# Patient Record
Sex: MALE | Race: OTHER | URBAN - METROPOLITAN AREA
[De-identification: names, ages, dates, MRNs, and addresses within clinical notes are randomized per-mention and may not be internally consistent; named-entity substitution may affect disease eponyms.]

---

## 2019-03-14 ENCOUNTER — OFFICE VISIT (OUTPATIENT)
Dept: ORTHOPEDIC SURGERY | Age: 49
End: 2019-03-14

## 2019-03-14 VITALS
RESPIRATION RATE: 14 BRPM | BODY MASS INDEX: 25.65 KG/M2 | DIASTOLIC BLOOD PRESSURE: 79 MMHG | SYSTOLIC BLOOD PRESSURE: 127 MMHG | WEIGHT: 159.6 LBS | HEIGHT: 66 IN | HEART RATE: 78 BPM | TEMPERATURE: 98.3 F | OXYGEN SATURATION: 100 %

## 2019-03-14 DIAGNOSIS — M54.9 UPPER BACK PAIN: ICD-10-CM

## 2019-03-14 DIAGNOSIS — M51.36 DDD (DEGENERATIVE DISC DISEASE), LUMBAR: Primary | ICD-10-CM

## 2019-03-14 DIAGNOSIS — M54.5 LOW BACK PAIN, UNSPECIFIED BACK PAIN LATERALITY, UNSPECIFIED CHRONICITY, WITH SCIATICA PRESENCE UNSPECIFIED: ICD-10-CM

## 2019-03-14 RX ORDER — CELECOXIB 100 MG/1
CAPSULE ORAL
Qty: 60 CAP | Refills: 1 | Status: SHIPPED | OUTPATIENT
Start: 2019-03-14 | End: 2019-04-18 | Stop reason: SDUPTHER

## 2019-03-14 RX ORDER — LORATADINE 10 MG/1
10 TABLET ORAL
COMMUNITY

## 2019-03-14 RX ORDER — GABAPENTIN 300 MG/1
300 CAPSULE ORAL
Qty: 30 CAP | Refills: 1 | Status: SHIPPED | OUTPATIENT
Start: 2019-03-14 | End: 2019-04-18 | Stop reason: SDUPTHER

## 2019-03-14 NOTE — PATIENT INSTRUCTIONS
Aprenda sobre la enfermedad degenerativa del disco - [ Learning About Degenerative Disc Disease ]  ¿Qué es la enfermedad degenerativa del disco?    La enfermedad degenerativa del disco o discopatía degenerativa no es realmente alonso enfermedad. Es un término usado para describir los Monique Efrem discos de la [place]umna vertebral a medida que se envejece. Los discos intervertebrales son discos pequeños y esponjosos que separan los huesos (vértebras) que conforman la [place] vertebral. Los discos cumplen la función de amortiguar el impacto en la [place], para que esta pueda flexionarse, inclinarse y girar. La enfermedad degenerativa del disco puede ocurrir en ton o más lugares a lo sandy de la columna. Ocurre la mayoría de las [employer] discos en la parte baja de la espalda y el reynold. ¿Qué la causa? A medida que envejecemos, nuestros discos intervertebrales se deterioran o degeneran. Celeste deterioro causa los síntomas de la enfermedad degenerativa del disco en algunas personas. Cuando los discos se deterioran, pueden perder líquido y secarse, y las capas exteriores pueden tener grietas o desgarros diminutos. [place] lleva a megan acolchado y [employer] de la columna. El cuerpo reacciona a esto creando protuberancias óseas en la [place]um[place] llamadas espolones óseos. Estos espolones pueden presionar en las raíces nerviosas raquídeas o la médula loco. [place] puede causar dolor y puede afectar el buen funcionamiento de los nervios. ¿Cuáles son los síntomas? Muchas personas con enfermedad degenerativa del disco no tienen dolor. Morgan otras tienen dolor intenso u otros síntomas que limitan lauren O'shu. Algunos de los síntomas más comunes son:  · Dolor en la espalda o el reynold. El lugar del dolor depende de los discos que se vean afectados. · Dolor que empeora cuando usted se [place], soledad al inclinarse hacia adelante, estirarse o girar.   · Dolor que puede ocurrir en los glúteos (nalgas), el brazo o la pierna si está pinzado un nervio. · Entumecimiento u hormigueo en el brazo o la pierna. ¿Cómo se diagnostica? Un médico a menudo puede diagnosticar la enfermedad degenerativa del disco romel un examen físico. Si koo examen no muestra señales de alonso afección grave, es poco probable que las pruebas de diagnóstico por imágenes (soledad alonso radiografía) ayuden a koo médico a encontrar la causa de lauren síntomas. A veces, la enfermedad degenerativa del disco se descubre cuando se cornelius alonso radiografía por otra razón, soledad alonso lesión u otro problema de [place]. Morgan aun si el médico descubre que usted tiene enfermedad degenerativa del disco, eso no siempre significa que usted vaya a tener síntomas. ¿Cómo se trata? Hay varias cosas que usted puede hacer en el hogar para manejar el dolor causado por mirian problema. · Para [employer], use hielo o calor (el que le dé mayor alivio) en la jerman afectada. ? Aplique hielo o alonso compresa fría en la jerman por entre 10 y 21 minutos cada vez. Póngase un paño sam entre el hielo y la piel. ? Póngase alonso bolsa de [place], alonso almohadilla eléctrica ajustada a nivel bajo o un paño tibio en la espalda. Póngase un paño sam entre la almohadilla térmica y la piel. No se vaya a dormir con alonso almohadilla térmica sobre la piel. · Pregúntele a koo médico si puede jenifer acetaminofén (soledad Tylenol) o medicamentos antiinflamatorios no esteroideos, soledad ibuprofeno o naproxeno. Koo médico puede recetarle medicamentos más potentes si es necesario. Sea jaguar con los medicamentos. Tatyana y siga todas las instrucciones de la [place]. · Lizzy algo de ejercicio todos los días. El ejercicio es alonso de las mejores maneras para ayudar a tener menos dolor en la espalda y a que se mantenga en mejores condiciones. Es mejor comenzar cada ejercicio lentamente. Es posible que note un poco de dolor, y eso está sujit. Morgan si un ejercicio le empeora el dolor, destephanie de Corina.  Aquí hay cosas que puede probar:  ? Ivy Bloch. Es la [place] simple y jose francisco vez la mejor para la espalda. Promueve la circulación de la steven y [place] a los músculos a mantenerse stefan. ? Ejercicios que le estiren suavemente y [employer] músculos del abdomen, la espalda y las piernas. Mientras más stefan estén esos músculos, mejor podrán protegerle la espalda. Si tiene dolor denita o intenso en la espalda o la columna, jose francisco vez necesite otros tratamientos, soledad fisioterapia. En algunos casos, es posible que koo médico le sugiera operarse. La atención de seguimiento es alonso parte clave de koo tratamiento y seguridad. Asegúrese de hacer y acudir a todas las citas, y llame a koo médico si está teniendo problemas. También es alonso buena idea saber los resultados de lauren exámenes y mantener alonso lista de los medicamentos que cornelius. ¿Dónde puede encontrar más información en inglés? Minor [place]ia a http://lonny-leonor.info/. Mika DUMONT en la búsqueda para aprender más acerca de \"Aprenda sobre la enfermedad degenerativa del disco - [ Learning About Degenerative Disc Disease ]. \"  Revisado: 20 septiembre, 2018  Versión del contenido: 11.9  © 7611-0657 Healthwise, Incorporated. Las instrucciones de cuidado fueron adaptadas bajo licencia por [employer] (which disclaims liability or warranty for this information). Si usted tiene [place] afección médica o sobre estas instrucciones, siempre pregunte a koo profesional de mehran. Healthwise, Incorporated niega toda garantía o responsabilidad por koo uso de esta información. Dolor en la parte baja de la espalda (lumbalgia): Ejercicios - [ Low Back Pain: Exercises ]  Instrucciones de cuidado  Aquí se presentan algunos ejemplos de ejercicios típicos de rehabilitación para tratar koo afección. Empiece cada ejercicio lentamente. Reduzca la intensidad del ejercicio si Lashae Retort a tener dolor.   Koo médico o fisioterapeuta le dirán cuándo puede comenzar con estos ejercicios y cuáles funcionarán mejor para usted. Cómo hacer los ejercicios  [place]    1. Acuéstese boca abajo, apoyando koo cuerpo con los antebrazos. 2. Lizzy presión con los codos en el piso para elevar la espalda. Al hacer esto, relaje los músculos del estómago y permita que koo espalda se arquee sin usar los músculos de la espalda. Al hacer presión, evite que las caderas o la pelvis se separen del piso. 3. Mantenga la posición de 15 a 30 segundos y luego relájese. 4. Repita de 2 a 4 veces. Ejercicio de alternar brazo y pierna (gladys de caza)    1. Comience con las anali y las rodillas en el piso. 2. Contraiga los músculos del abdomen. 3. Eleve alonso pierna del suelo y manténgala recta detrás de usted. Tenga cuidado de no dejar caer la cadera hacia abajo, porque eso hará que se le tuerza el tronco.  4. Mantenga la posición romel unos 6 segundos y luego baje la pierna y Pablo a la otra pierna. 5. Repita de 8 a 12 veces con cada pierna. 6. Con el tiempo, vaya aumentando [employer] de 10 a 30 segundos cada vez.  7. Si se siente estable y seguro con la pierna elevada, intente levantar el brazo opuesto directamente enfrente de usted al [facility]. Ejercicio de rodillas al pecho    1. Acuéstese boca arriba con las rodillas flexionadas y los pies apoyados sobre el piso. 2. Lleve alonso rodilla hacia el pecho, manteniendo el otro pie apoyado en el suelo (o dejando la otra pierna recta, soledad lo sienta mejor en la parte baja de la espalda). 3. Mantenga la parte baja de la espalda presionada contra el suelo. Sosténgalo por lo menos de 15 a 30 segundos. 4. Relájese y regrese la rodilla a la posición inicial.  5. Repita el ejercicio con la otra pierna. Repita de 2 a 4 veces con cada pierna. 6. Para lograr mayor estiramiento, deje la otra pierna apoyada en el suelo mientras se meghan la rodilla [place]. Abdominales    1.  Acuéstese en el suelo boca arriba, con las rodillas dobladas en un ángulo de 90 grados. Los pies deben estar apoyados en el suelo, a unas 12 pulgadas (30 cm) de las nalgas (glúteos). 2. Cruce los [place]. Si esto le causa molestias en el reynold, pruebe a poner las anali detrás del reynold (no de la maureen), con los codos abiertos. 3. Contraiga lentamente los músculos del abdomen y eleve los omóplatos del suelo. 4. Mantenga la maureen alineada con el cuerpo y no presione la barbilla hacia el pecho. 5. Sostenga esta posición por 1 o 2 segundos y después baje lentamente de nuevo hacia el suelo. 6. Repita de 8 a 12 veces. Ejercicio de inclinación de pelvis    1. Acuéstese de espalda con las rodillas flexionadas. 2. \"Tense\" koo estómago. [place] significa apretar los músculos contrayendo el ombligo e imaginando que se mueve hacia la columna vertebral. Deberá sentir soledad si la espalda estuviera ejerciendo presión sobre el suelo y que las caderas y la pelvis se balancean hacia atrás. 3. Mantenga la posición romel aproximadamente 6 segundos mientras respira suavemente. 4. Repita de 8 a 12 veces. [place] con el talón    1. Acuéstese boca arriba con ambas rodillas flexionadas y los tobillos doblados de manera que solo los talones estén sobre el piso. Las rodillas deben estar dobladas más o menos a 90 grados. 2. A continuación criselda presión con los talones en el suelo, apriete las nalgas (glúteos) y levante las caderas del suelo hasta que los hombros, las caderas y las rodillas estén en línea recta. 3. Mantenga la posición romel unos 6 segundos mientras sigue respirando normalmente, y luego baje lentamente las caderas hacia el piso y descanse por hasta 10 segundos. [address] a 12 repeticiones. Estiramiento de isquiotibiales en el ck de alonso jackie    1. Acuéstese boca arriba en el ck de [place], con alonso pierna a través de la [place]. 2. Deslice la pierna hacia arriba por la pared, para enderezar la rodilla.  Debe sentir un leve estiramiento en la parte posterior de la pierna. 3. Sostenga el estiramiento por lo menos de 15 a 30 segundos. No arquee la espalda, estire los dedos de los pies ni flexione las rodillas. Mantenga un talón tocando el suelo y el otro tocando la pared. 4. Repita con la otra pierna. 5. Repita de 2 a 4 veces con cada pierna. Estiramiento de los músculos flexores de la cadera    1. Póngase de rodillas en el suelo con alonso Lennis Brine y Diandra Apple atrás. Coloque la rodilla de adelante encima del pie. Mantenga la otra rodilla en contacto con el suelo. 2. Empuje lentamente la cadera hacia adelante hasta que sienta un estiramiento en la parte superior del muslo de la pierna que está atrás. 3. Sostenga el estiramiento por lo menos de 15 a 30 segundos. Repita con la otra pierna. 4. Repita de 2 a 4 veces a cada lado. Sentadillas de pared    1. Párese con la espalda a entre 10 y 12 pulgadas (25 a 30 cm) de distancia de la pared. 2. Inclínese hacia la pared [employer] la espalda quede plana sobre kenny. 3. Deslícese lentamente hacia abajo hasta que las rodillas estén ligeramente flexionadas, presionando la parte baja de la espalda contra la pared. 4. Mantenga la posición romel unos 6 segundos y después deslícese hacia arriba por la pared. 5. Repita de 8 a 12 veces. La atención de seguimiento es alonso parte clave de koo tratamiento y seguridad. Asegúrese de hacer y acudir a todas las citas, y llame a koo médico si está teniendo problemas. También es alonso buena idea saber los resultados de lauren exámenes y mantener alonso lista de los medicamentos que cornelius. ¿Dónde puede encontrar más información en inglés? Anusha Cedillo a http://lonny-leonor.info/. Rosa Pink [id number] en la búsqueda para aprender más acerca de \"Dolor en la parte baja de la espalda (lumbalgia): Ejercicios - [ Low Back Pain: Exercises ]. \"  Revisado: 20 septiembre, 2018  Versión del contenido: 11.9  © 5826-0940 [employer].  Las instrucciones de cuidado fueron adaptadas bajo licencia por [employer] (which disclaims liability or warranty for this information). Si usted tiene [place] afección médica o sobre estas instrucciones, siempre pregunte a koo profesional de mehran. [facility] niega toda garantía o responsabilidad por koo uso de esta información.

## 2019-03-14 NOTE — PROGRESS NOTES
[address], [address]  [place] [address]  Phone: [phone]  Fax: [phone]        Steffany Pérez  : 1970  PCP: No primary care provider on file. NEW PATIENT      ASSESSMENT AND PLAN     Diagnoses and all orders for this visit:    1. DDD (degenerative disc disease), lumbar    2. Low back pain, unspecified back pain laterality, unspecified chronicity, with sciatica presence unspecified  -     AMB POC XRAY, SPINE, LUMBOSACRAL; 4+    3. Upper back pain  -     AMB POC XRAY, SPINE; THORACIC, 2 VIEW       1. Advised to stay active as tolerated. 2. Discussed life style modification, PT, medication as treatment options  3. Trial of Celebrex  4. Given information on low back exercises and DDD In Taiwanese    Follow-up Disposition:  Return if symptoms worsen or fail to improve. [address] [facility] is seen today as self referral for complaints of upper and low back pain. [address] is a 54 y.o. male. Today pt c/o upper and low back pain of 8 years duration. Pt has had trauma that caused pain. He reports an injury 8 years ago lifting a heavy rock. . Pt speaks Taiwanese, very little [place]. Visit conducted using [facility] [place]  phone. Pt states his pain is worse with prolonged sitting. Pt notes that standing and walking does not aggravate nor relieve his pain. He states he does not feel much better with laying. Pt denies weight loss, night sweats. He notes his legs do burn and tingle at night. Pt denies hx of DM. No red flags. Location of pain: mid back  Does pain radiate into extremities: BLE buttock and thigh pain and numbness R>L, worse at night  Does patient have weakness: no   Pt denies saddle paresthesias. Medications pt is on: Anti-inflammatory from [facility] and yoga with previous benefit. He notes it is decreasing in effectiveness. Pt denies recent ED visits or hospitalizations. Denies persistent fevers, chills, weight changes, neurogenic bowel or bladder symptoms. Pt denies any recent GI ulcers, bleeds or renal dysfucntion. Treatments patient has tried:  Physical therapy:No  Doing HEP: Yoga with decreasing benefit  Non-opioid medications: Yes  Spinal injections: No  Spinal surgery- No.      reviewed. PMHx of HTN. Pt works as a cook. Pain Assessment  3/14/2019   Location of Pain Back   Severity of Pain 8   Quality of Pain Aching; Sharp;Burning   Duration of Pain Persistent   Frequency of Pain Constant   Limiting Behavior Yes   Result of Injury No         PAST MEDICAL HISTORY   Past Medical History:   Diagnosis Date    Hypertension        History reviewed. No pertinent surgical history. MEDICATIONS      Current Outpatient Medications   Medication Sig Dispense Refill    loratadine (CLARITIN) 10 mg tablet Take 10 mg by mouth.          ALLERGIES  No Known Allergies       SOCIAL HISTORY    Social History     Socioeconomic History    Marital status: UNKNOWN     Spouse name: Not on file    Number of children: Not on file    Years of education: Not on file    Highest education level: Not on file   Social Needs    Financial resource strain: Not on file    Food insecurity - worry: Not on file    Food insecurity - inability: Not on file   Catalyze needs - medical: Not on file   Catalyze needs - non-medical: Not on file   Occupational History    Occupation: [employer]     Employer: OTHER     Comment: [address]    Smoking status: Never Smoker    Smokeless tobacco: Never Used   Substance and Sexual Activity    Alcohol use: No     Frequency: Never    Drug use: Not on file    Sexual activity: Not on file   Other Topics Concern   [address] Service Not Asked    Blood Transfusions Not Asked    Caffeine Concern Not Asked    Occupational Exposure Not Asked    Hobby Hazards Not Asked    Sleep Concern Not Asked    Stress Concern Not Asked    Weight Concern Not Asked    Special Diet Not Asked    Back Care Not Asked    Exercise Not Asked    Bike Helmet Not Asked   [address] Not Asked    Self-Exams Not Asked   Social History Narrative    Not on file       FAMILY HISTORY  Family History   Problem Relation Age of Onset    No Known Problems Mother     No Known Problems Father          REVIEW OF SYSTEMS  Review of Systems   Constitutional: Negative for chills, fever and weight loss. Respiratory: Negative for shortness of breath. Cardiovascular: Negative for chest pain. Gastrointestinal: Negative for constipation. Negative for fecal incontinence   Genitourinary: Negative for dysuria. Negative for urinary incontinence   Musculoskeletal: Positive for back pain and myalgias. Per HPI   Skin: Negative for rash. Neurological: Positive for tingling. Negative for dizziness, tremors, focal weakness and headaches. Endo/Heme/Allergies: Does not bruise/bleed easily. Psychiatric/Behavioral: The patient does not have insomnia. PHYSICAL EXAMINATION  Visit Vitals  /79   Pulse 78   Resp 14   Wt 159 lb 9.6 oz (72.4 kg)   SpO2 100%          Accompanied by self. Constitutional:  Well developed, well nourished, in no acute distress. Psychiatric: Affect and mood are appropriate. Integumentary: No rashes or abrasions noted on exposed areas. Cardiovascular/Peripheral Vascular: Intact l pulses. No peripheral edema is noted BLE. Lymphatic:  No evidence of lymphedema. No cervical lymphadenopathy. SPINE/MUSCULOSKELETAL EXAM    Cervical spine:  Neck is midline. Normal muscle tone. No focal atrophy is noted. Tenderness to palpation C7-T1. Negative Spurling's sign. Negative Tinel's sign. Negative Sandoval's sign. Sensation grossly intact to light touch. Thoracic spine:  Tenderness to palpation R TL junction. Lumbar spine:  No rash, ecchymosis, or gross obliquity. No fasciculations. No focal atrophy is noted. No increased pain with flexion or extension. Tenderness to palpation B/L L4-5. No tenderness to palpation at the sciatic notch. SI joints non-tender. Trochanters non tender. Sensation grossly intact to light touch. MOTOR:       Hip Flex  Quads Hamstrings Ankle DF EHL Ankle PF   Right +4/5 +4/5 +4/5 +4/5 +4/5 +4/5   Left +4/5 +4/5 +4/5 +4/5 +4/5 +4/5     DTRs are 2+ patella, and 1+ Achilles. Straight Leg raise negative. Ambulation without assistive device. FWB. RADIOGRAPHS  Thoracic spine xray films reviewed:  1) no compression    Lumbar spine xray films reviewed:  1) disc space narrowing L5-S1   2) facet changes L4 to sacrum  3) no instability    Written by Kaykay Bright, as dictated by Kareem Hayden MD.    I, Dr. Kareem Hayden MD, confirm that all documentation is accurate. Mr. Nora Koch may have a reminder for a \"due or due soon\" health maintenance. I have asked that he contact his primary care provider for follow-up on this health maintenance.

## 2019-04-18 ENCOUNTER — OFFICE VISIT (OUTPATIENT)
Dept: ORTHOPEDIC SURGERY | Age: 49
End: 2019-04-18

## 2019-04-18 VITALS
RESPIRATION RATE: 16 BRPM | TEMPERATURE: 97.8 F | BODY MASS INDEX: 26 KG/M2 | HEART RATE: 70 BPM | DIASTOLIC BLOOD PRESSURE: 84 MMHG | SYSTOLIC BLOOD PRESSURE: 124 MMHG | OXYGEN SATURATION: 98 % | WEIGHT: 161.8 LBS | HEIGHT: 66 IN

## 2019-04-18 DIAGNOSIS — M51.36 DDD (DEGENERATIVE DISC DISEASE), LUMBAR: Primary | ICD-10-CM

## 2019-04-18 RX ORDER — CELECOXIB 100 MG/1
CAPSULE ORAL
Qty: 60 CAP | Refills: 5 | Status: SHIPPED | OUTPATIENT
Start: 2019-04-18 | End: 2019-10-17 | Stop reason: SDUPTHER

## 2019-04-18 RX ORDER — GABAPENTIN 300 MG/1
300 CAPSULE ORAL
Qty: 30 CAP | Refills: 5 | Status: SHIPPED | OUTPATIENT
Start: 2019-04-18

## 2019-04-18 NOTE — PATIENT INSTRUCTIONS
Cuidado de la espalda y prevención de lesiones: Instrucciones de cuidado - [ Back Care and Preventing Injuries: Care Instructions ]  Instrucciones de cuidado    Usted puede lesionarse la espalda con muchas de las actividades de la mina diaria, soledad levantar alonso caja pesada, inclinarse en el jardín, hacer ejercicio en el gimnasio e incluso al salir de la cama. Morgan puede mantener koo espalda monique y baron haciendo algunos ejercicios. También puede seguir algunos consejos para sentarse, dormir y levantar cosas y evitar volver a lesionarse. Hable con koo médico antes de empezar un programa de ejercicios. Pida ayuda si necesita más información sobre cómo mantener koo espalda saludable. La atención de seguimiento es alonso parte clave de koo tratamiento y seguridad. Asegúrese de hacer y acudir a todas las citas, y llame a koo médico si está teniendo problemas. También es alonso buena idea saber los resultados de lauren exámenes y mantener alonso lista de los medicamentos que cornelius. ¿Cómo puede cuidarse en el hogar? · Mantenga un peso saludable para evitar sobrecargar la parte baja de la espalda. · No fume. Fumar aumenta el riesgo de osteoporosis, que debilita la columna vertebral. Si necesita ayuda para dejar de fumar, hable con koo médico sobre programas y medicamentos para dejar de fumar. Podrían aumentar lauren probabilidades de dejar el hábito para siempre. · Asegúrese de dormir en alonso posición que mantenga las curvas naturales de la espalda y en un colchón cómodo. Duerma de lado con alonso almohada entre las rodillas o boca arriba con alonso almohada debajo de las rodillas. Estas posiciones pueden reducir la tensión en la espalda. · Cuando salga de la cama, acuéstese de lado y doble ambas rodillas. Deje que los pies cuelguen por el borde de la cama mientras empuja el cuerpo hacia arriba con ambos brazos. Deslícese hacia el borde de la cama.  Asegúrese de [employer] estén alineados con las nalgas (los glúteos) y Delonte chatterjee de pie.  · Si debe permanecer de pie mucho tiempo, ponga un pie sobre un taburete, un bordillo o alonso caja. Lizzy ejercicio para fortalecer la espalda y otros músculos  · [employer] al menos 30 minutos de ejercicio la mayoría de los días de la [place]. Caminar es alonso buena opción. Marguerita American desee hacer otras actividades, soledad correr, nadar, [employer], o jugar al tenis o deportes de equipo. · Estire los músculos de la espalda. Aquí hay algunos ejercicios para intentar:  ? Acuéstese de espalda con las rodillas flexionadas y los pies apoyados en el suelo. Lleve suavemente alonso de las rodillas flexionadas hacia el pecho. Vuelva a poner teto pie en el suelo y luego lizzy lo mismo con la otra rodilla. Mantenga entre 15 y 27 segundos. Repita de 2 a 4 veces. ? Lizzy inclinaciones pélvicas. Acuéstese de espalda con las rodillas flexionadas. Contraiga los músculos abdominales. Hunda koo ombligo hacia adentro y [place], en dirección a las costillas. Deberá sentir soledad si la espalda estuviera ejerciendo presión sobre el suelo y que las caderas y la pelvis se despegan levemente del suelo. Mantenga la posición romel 6 segundos mientras respira de Liseth pausada. · Mantenga stefan los músculos del tronco. Los músculos de la espalda, el abdomen y los glúteos sostienen la columna vertebral.  ? Contraiga el abdomen e imagine que julito del ombligo hacia la columna. Mantenga la posición romel 6 segundos y luego relájese. Recuerde seguir respirando de manera normal [employer]. ? Morales Sol. Hágalos siempre con las rodillas dobladas. Mantenga la parte baja de la espalda sobre el suelo y [employer] hombros hacia las rodillas con un movimiento lento y uniforme. Mantenga los brazos cruzados sobre el pecho. Si esto le causa molestias en el reynold, pruebe a poner las anali detrás del reynold (no de la maureen), con los codos abiertos. ?  Acuéstese boca arriba con las rodillas flexionadas y los pies apoyados sobre el suelo. Contraiga los músculos abdominales y luego empuje con los pies y eleve las nalgas algunas pulgadas. Mantenga la posición romel 6 segundos mientras continúa respirando de Liseth normal y luego vuelva a bajar lentamente hacia el suelo. Repita de 8 a 12 veces. ? Si le gusta el ejercicio en nevaeh, pruebe con Pilates o yoga. Estas clases tienen posiciones que fortalecen los músculos del tronco.  Protéjase la espalda mientras está sentado  · Colóquese alonso almohada pequeña, alonso toalla enrollada o un rollo lumbar en la curva de la espalda si necesita más apoyo. · Siéntese en alonso silla que sea lo suficientemente baja soledad para poner ambos pies en el suelo con las rodillas renee al nivel de las caderas. Si koo silla o escritorio es [employer], use un reposapiés para elevar las rodillas. · Al conducir, Omar's las rodillas renee al nivel de las caderas. Siéntese derecho y conduzca con ambas anali sobre el volante. Los brazos deben estar levemente flexionados. · Pruebe usar alonso silla ergonómica con apoyo para las rodillas (\"kneeling chair\"), la cual ayuda a inclinar las caderas hacia taet. Sedillo le reduce la presión en la parte baja de la espalda. · Intente sentarse sobre alonso pelota de ejercicio. Puede balancearse de lado a lado, lo que ayuda a mantener la espalda relajada. Levante objetos de manera correcta  · Acuclíllese, doblando solo las caderas y las rodillas. Si es necesario, ponga alonso rodilla en el suelo y extienda la otra rodilla frente a usted en ángulo recto (genuflexión). · Empuje el pecho hacia tate. Sedillo le ayuda a mantener la parte superior de la espalda derecha mientras mantiene un arco ligero en la parte baja. · Sostenga la carga tan cerca de koo cuerpo soledad sea posible, al nivel del ombligo. · Utilice los pies para cambiar de dirección con pasos cortos. · Dirija con las caderas al cambiar de dirección. Mantenga los hombros alineados con las caderas mientras se desplaza.  No tuerza el cuerpo. · Asiente la carga con cuidado, acuclillándose únicamente con las rodillas y las caderas. ¿Cuándo debe pedir ayuda? Preste especial atención a los cambios en castillo mehran y asegúrese de comunicarse con castillo médico si:    · Castillo dolor de espalda empeora. ¿Dónde puede encontrar más información en inglés? Suraj Quiros a http://lonny-leonor.info/. Oliviariba S810 en la búsqueda para aprender más acerca de \"Cuidado de la espalda y prevención de lesiones: Instrucciones de cuidado - [ Back Care and Preventing Injuries: Care Instructions ]. \"  Revisado: 20 septiembre, 2018  Versión del contenido: 11.9  © 5027-3302 Healthwise, Incorporated. Las instrucciones de cuidado fueron adaptadas bajo licencia por [employer] Connections (which disclaims liability or warranty for this information). Si usted tiene [place] afección médica o sobre estas instrucciones, siempre pregunte a castillo profesional de mehran. Healthwise, Incorporated niega toda garantía o responsabilidad por castillo uso de esta información. Antiinflamatorios no esteroideos Trude Carbajal): Instrucciones de cuidado - [ Nonsteroidal Anti-Inflammatory Drugs (NSAIDs): Care Instructions ]  Instrucciones de cuidado    Los antiinflamatorios no esteroideos (CHRISS) alivian el dolor y la [place]. También puede usarlos para reducir la hinchazón y la inflamación. Los CHRISS de venta suzi incluyen:  · Ibuprofeno (Advil, Motrin). · Naproxeno (Aleve). La aspirina ([address]) también es un [place]. Morgan no funciona del mismo modo que estos otros [place]. Los CHRISS recetados incluyen:  · Diclofenaco (Voltaren). · Indometacina (Indocin). · Piroxicam (Feldene). [place] los [place] exactamente soledad le fueron recetados. Llame a castillo médico si denise estar teniendo problemas con castillo medicamento. Si usted está tomando medicamentos de [employer], dacia y siga todas las instrucciones de la etiqueta.   [place] de seguimiento es alonso parte clave de castillo tratamiento y seguridad. Asegúrese de hacer y acudir a todas las citas, y llame a koo médico si está teniendo problemas. También es alonos buena idea saber los resultados de va exámenes y mantener alonso lista de los medicamentos que cornelius. ¿Qué debe saber acerca de los Jose M Jacky? · No use un CHRISS sin receta por más de 10 días. Hable elisha con koo médico.  · Los efectos secundarios más comunes de los Jose M Jacky son dolor estomacal, acidez estomacal y [place]. Los Jose M Jacky pueden irritar el revestimiento estomacal. Si el medicamento le causa malestar [place], puede probar a tomarlo con las comidas. Morgan si eso no ayuda, hable con koo médico para asegurarse de que no sea un problema más maren, soledad alonso úlcera estomacal o sangrado en el estómago o los intestinos. · El uso de Jose M Jacky puede:  ? Causar presión arterial jerry. ? Empeorar los síntomas de insuficiencia cardíaca. ? Aumentar el riesgo de ataque cardíaco, ataque cerebral, daño renal y reacciones cutáneas. · Va riesgos son mayores si cornelius CHRISS en dosis más altas o romel más tiempo de lo que dice la [place]. Las [employer] son mayores de 72 años o que tienen enfermedad cardíaca, estomacal o intestinal tienen mayor riesgo de problemas. Aspirina  La aspirina no es [employer] otros Jose M Jacky. Puede ayudar a determinadas personas a reducir koo riesgo de un ataque cardíaco o un ataque cerebral. Morgan jenifer aspirina no es adecuado para todas las personas, debido a que puede producir sangrado grave. Hable con koo médico antes de empezar a jenifer [facility]. Usted y koo médico pueden decidir si la aspirina es alonso buena opción para usted según koo riesgo de un ataque cardíaco o un ataque cerebral y koo riesgo de sangrado grave. Si tiene un riesgo bajo de un ataque al corazón o un ataque cerebral, los beneficios de la aspirina probablemente no superen el riesgo de sangrado.   · Si [place] otros CHRISS en mucha cantidad, la aspirina puede no funcionar tan sujit para prevenir el ataque cardíaco y [facility] ataque cerebral.  · Si cornelius aspirina todos los días para koo corazón, hable con koo médico antes de jenifer otros CHRISS. · No le dé aspirina a nadie megan de [address] Se ha relacionado con el síndrome de [place], alonso enfermedad grave. ¿Cuándo debe pedir ayuda? Llame al 911 en cualquier momento que considere que necesita atención de [place]. Por ejemplo, llame si:    · Se desmayó (perdió el conocimiento).   · Vomita steven o algo parecido a granos de café molido.     · Las heces son de color rojizo o muy sanguinolentas (con steven).    Llame a koo médico ahora mismo o busque atención médica inmediata si:    · Va heces son negruzcas y parecidas al alquitrán o tienen rastros de steven.    Preste especial atención a los cambios en koo mehran y asegúrese de comunicarse con koo médico si tiene algún problema. ¿Dónde puede encontrar más información en inglés? [place] a http://lonny-leonor.info/. Escriba A328 en la búsqueda para aprender más acerca de \"Antiinflamatorios no esteroideos (Justine Micah): Instrucciones de cuidado - [ Nonsteroidal Anti-Inflammatory Drugs (NSAIDs): Care Instructions ]. \"  Revisado: 3 david, 2018  Versión del contenido: 11.9  © 6770-8839 Healthwise, Incorporated. Las instrucciones de cuidado fueron adaptadas bajo licencia por [employer] Connections (which disclaims liability or warranty for this information). Si usted tiene [place] afección médica o sobre estas instrucciones, siempre pregunte a koo profesional de mehran. Healthwise, Incorporated niega toda garantía o responsabilidad por koo uso de esta información.

## 2019-04-18 NOTE — PROGRESS NOTES
[facility] [address], [address]  [facility], [address]  Phone: [phone]  Fax: [phone]        Elsa Vincent  : 1970  PCP: No primary care provider on file. PROGRESS NOTE      ASSESSMENT AND PLAN    Diagnoses and all orders for this visit:    1. DDD (degenerative disc disease), lumbar  -     celecoxib (CELEBREX) 100 mg capsule; Take 1 capsule PO Daily-BID PRN Pain, Take with Food. Indications: Joint Damage causing Pain and Loss of Function  -     gabapentin (NEURONTIN) 300 mg capsule; Take 1 Cap by mouth nightly as needed for Pain. 1. Advised to continue HEP. 2. Continue Celebrex and Gabapentin PRN  3. Given information on preventing injury and NSAIDs in [place]    F/U PRN or 6 months      [address] is a 54 y.o. male. Pt presents to the office for a f/u visit for low back pain. Last OV given trial of Celebrex and Gabapentin. Pt speaks [place], very little [place]. Visit conducted using [facility]  phone. Pt reports relief with Celebrex, takes prn. He denies stomach upset. Notes constipation and diarrhea that came and went, inquires if connected to his back. Pt reports benefit with taking Gabapentin, now takes PRN. When he has pain , ususally on the left side of his back, denies sciatica. Location of pain: mid and low back L>R  Does pain radiate into extremities: BLE pain and numbness controlled with Gabapentin  Does patient have weakness: no   Pt denies saddle paresthesias. Medications pt is on: Celebrex 100mg PRN. Gabapentin 300mg QHS, now PRN. Pt denies recent ED visits or hospitalizations. Denies persistent fevers, chills, weight changes, neurogenic bowel or bladder symptoms. Treatments patient has tried:  Physical therapy:No  Doing HEP: Yoga with decreasing benefit  Non-opioid medications: Yes  Spinal injections: No  Spinal surgery- No.      reviewed. PMHx of HTN.  Pt works as a erwin. Initial injury lifting heavy rock 8 years ago. Pain Assessment  3/14/2019   Location of Pain Back   Severity of Pain 8   Quality of Pain Aching; Sharp;Burning   Duration of Pain Persistent   Frequency of Pain Constant   Limiting Behavior Yes   Result of Injury No       PAST MEDICAL HISTORY   Past Medical History:   Diagnosis Date    Hypertension        No past surgical history on file. [facility] MEDICATIONS      Current Outpatient Medications   Medication Sig Dispense Refill    celecoxib (CELEBREX) 100 mg capsule Take 1 capsule PO Daily-BID PRN Pain, Take with Food. Indications: Joint Damage causing Pain and Loss of Function 60 Cap 5    gabapentin (NEURONTIN) 300 mg capsule Take 1 Cap by mouth nightly as needed for Pain. 30 Cap 5    loratadine (CLARITIN) 10 mg tablet Take 10 mg by mouth.           ALLERGIES  No Known Allergies       SOCIAL HISTORY    Social History     Socioeconomic History    Marital status: UNKNOWN     Spouse name: Not on file    Number of children: Not on file    Years of education: Not on file    Highest education level: Not on file   Occupational History    Occupation: Erwin     Employer: OTHER     Comment: [address] [facility] Financial resource strain: Not on file    Food insecurity:     Worry: Not on file     Inability: Not on file   [employer] needs:     Medical: Not on file     Non-medical: Not on file   Tobacco Use    Smoking status: Never Smoker    Smokeless tobacco: Never Used   Substance and Sexual Activity    Alcohol use: No     Frequency: Never    Drug use: Not on file    Sexual activity: Not on file   Lifestyle    Physical activity:     Days per week: Not on file     Minutes per session: Not on file    Stress: Not on file   Relationships    Social connections:     Talks on phone: Not on file     Gets together: Not on file     Attends Mosque service: Not on file     Active member of club or organization: Not on file     Attends meetings of clubs or organizations: Not on file     Relationship status: Not on file    Intimate partner violence:     Fear of current or ex partner: Not on file     Emotionally abused: Not on file     Physically abused: Not on file     Forced sexual activity: Not on file   Other Topics Concern     Service Not Asked    Blood Transfusions Not Asked    Caffeine Concern Not Asked    Occupational Exposure Not Asked    Hobby Hazards Not Asked    Sleep Concern Not Asked    Stress Concern Not Asked    Weight Concern Not Asked    Special Diet Not Asked    Back Care Not Asked    Exercise Not Asked    Bike Helmet Not Asked   [address] Not Asked    Self-Exams Not Asked   Social History Narrative    Not on file       FAMILY HISTORY    Family History   Problem Relation Age of Onset    No Known Problems Mother     No Known Problems Father        REVIEW OF SYSTEMS  Review of Systems   Constitutional: Negative for chills, fever and weight loss. Respiratory: Negative for shortness of breath. Cardiovascular: Negative for chest pain. Gastrointestinal: Negative for constipation. Negative for fecal incontinence   Genitourinary: Negative for dysuria. Negative for urinary incontinence   Musculoskeletal:        Per HPI   Skin: Negative for rash. Neurological: Negative for dizziness, tingling, tremors, focal weakness and headaches. Endo/Heme/Allergies: Does not bruise/bleed easily. Psychiatric/Behavioral: The patient does not have insomnia. PHYSICAL EXAMINATION  Visit Vitals  /84   Pulse 70   Temp 97.8 °F (36.6 °C)   Resp 16   Ht 5' 6\" (1.676 m)   Wt 161 lb 12.8 oz (73.4 kg)   SpO2 98%   BMI 26.12 kg/m²         Accompanied by self. Constitutional:  Well developed, well nourished, in no acute distress. Psychiatric: Affect and mood are appropriate. Integumentary: No rashes or abrasions noted on exposed areas. Cardiovascular/Peripheral Vascular: Intact l pulses.  No peripheral edema is noted BLE. Lymphatic:  No evidence of lymphedema. No cervical lymphadenopathy. SPINE/MUSCULOSKELETAL EXAM       Lumbar spine:  No rash, ecchymosis, or gross obliquity. No fasciculations. No focal atrophy is noted. Slight pain with lumbar extension. Tenderness to palpation none lumbar spine. No tenderness to palpation at the sciatic notch. SI joints non-tender. Trochanters non tender. Straight Leg raise negative. Ambulation without assistive device. FWB. Tandem gait, heel walk intact. DF/PF strength intact. Hip ROM pain free. Written by Aria Haddad, as dictated by Karen Lara MD.    I, Dr. Karen Lara MD, confirm that all documentation is accurate. Mr. Josh Hou may have a reminder for a \"due or due soon\" health maintenance. I have asked that he contact his primary care provider for follow-up on this health maintenance.

## 2019-10-17 ENCOUNTER — OFFICE VISIT (OUTPATIENT)
Dept: ORTHOPEDIC SURGERY | Age: 49
End: 2019-10-17

## 2019-10-17 VITALS
WEIGHT: 165.4 LBS | SYSTOLIC BLOOD PRESSURE: 130 MMHG | OXYGEN SATURATION: 99 % | TEMPERATURE: 98.4 F | BODY MASS INDEX: 26.58 KG/M2 | RESPIRATION RATE: 19 BRPM | DIASTOLIC BLOOD PRESSURE: 82 MMHG | HEIGHT: 66 IN | HEART RATE: 71 BPM

## 2019-10-17 DIAGNOSIS — M51.36 DDD (DEGENERATIVE DISC DISEASE), LUMBAR: Primary | ICD-10-CM

## 2019-10-17 RX ORDER — CELECOXIB 100 MG/1
CAPSULE ORAL
Qty: 60 CAP | Refills: 2 | Status: SHIPPED | OUTPATIENT
Start: 2019-10-17

## 2019-10-17 NOTE — PROGRESS NOTES
[address], [address]  [place] [address]  Phone: [phone]  Fax: [phone]        Shital Conley  : 1970  PCP: No primary care provider on file. PROGRESS NOTE      ASSESSMENT AND PLAN    Diagnoses and all orders for this visit:    1. DDD (degenerative disc disease), lumbar  -     celecoxib (CELEBREX) 100 mg capsule; Take 1 capsule PO Daily-BID PRN Pain, Take with Food. Indications: joint damage causing pain and loss of function       1. Advised to continue HEP. 2. Continue Celebrex and Gabapentin PRN  3. Given information on back care, urgent symptoms in Australian    F/U PRN    [address] is a 54 y.o. male. Pt presents to the office for a 6 month f/u visit for DDD. Pt speaks Australian, very little [place]. Visit conducted using [employer]  phone . Pt affirms he has been doing HEP. He indicates his back is improving, he does need the medications as often. Pt denies any BLE pain or paresthesias in a while. He notes some pain in both shoulders and elbows due to kitchen work. Pt went to hospital/PCP 1 month ago for stomach problem, maybe gastritis, which resolved. Pt notes the doctor did not find a problem. Did not tell him to stop Celebrex. Denies GI upset, black tarry stools w/Celebrex. No other health changes/issues. Still has RFs. Rare sciatica. Location of pain: mid and low back L>R occasionally  Does pain radiate into extremities: BLE rarely  Does patient have weakness: no   Pt denies saddle paresthesias. Medications pt is on: Celebrex 100mg PRN. Gabapentin 300mg PRN. Pt denies recent ED visits or hospitalizations. Denies persistent fevers, chills, weight changes, neurogenic bowel or bladder symptoms. Treatments patient has tried:  Physical therapy:No  Doing HEP: Yoga with decreasing benefit  Non-opioid medications: Yes  Spinal injections: No  Spinal surgery- No.      reviewed. PMHx of HTN.  Pt works as a cook. Initial injury lifting heavy rock 8 years ago. Pain Assessment  10/17/2019   Location of Pain Back   Severity of Pain 0   Quality of Pain -   Duration of Pain -   Frequency of Pain Intermittent   Limiting Behavior -   Result of Injury -       PAST MEDICAL HISTORY   Past Medical History:   Diagnosis Date    Hypertension        History reviewed. No pertinent surgical history. Elilara Escobar MEDICATIONS      Current Outpatient Medications   Medication Sig Dispense Refill    celecoxib (CELEBREX) 100 mg capsule Take 1 capsule PO Daily-BID PRN Pain, Take with Food. Indications: joint damage causing pain and loss of function 60 Cap 2    gabapentin (NEURONTIN) 300 mg capsule Take 1 Cap by mouth nightly as needed for Pain. 30 Cap 5    loratadine (CLARITIN) 10 mg tablet Take 10 mg by mouth.           ALLERGIES  No Known Allergies       SOCIAL HISTORY    Social History     Socioeconomic History    Marital status: UNKNOWN     Spouse name: Not on file    Number of children: Not on file    Years of education: Not on file    Highest education level: Not on file   Occupational History    Occupation: [employer]     Employer: OTHER     Comment: [facility] Financial resource strain: Not on file    Food insecurity:     Worry: Not on file     Inability: Not on file   [employer] needs:     Medical: Not on file     Non-medical: Not on file   Tobacco Use    Smoking status: Never Smoker    Smokeless tobacco: Never Used   Substance and Sexual Activity    Alcohol use: No     Frequency: Never    Drug use: Not on file    Sexual activity: Not on file   Lifestyle    Physical activity:     Days per week: Not on file     Minutes per session: Not on file    Stress: Not on file   Relationships    Social connections:     Talks on phone: Not on file     Gets together: Not on file     Attends Taoist service: Not on file     Active member of club or organization: Not on file     Attends meetings of clubs or organizations: Not on file     Relationship status: Not on file    Intimate partner violence:     Fear of current or ex partner: Not on file     Emotionally abused: Not on file     Physically abused: Not on file     Forced sexual activity: Not on file   Other Topics Concern     Service Not Asked    Blood Transfusions Not Asked    Caffeine Concern Not Asked    Occupational Exposure Not Asked    Hobby Hazards Not Asked    Sleep Concern Not Asked    Stress Concern Not Asked    Weight Concern Not Asked    Special Diet Not Asked    Back Care Not Asked    Exercise Not Asked    Bike Helmet Not Asked   [address] Not Asked    Self-Exams Not Asked   Social History Narrative    Not on file       FAMILY HISTORY    Family History   Problem Relation Age of Onset    No Known Problems Mother     No Known Problems Father        REVIEW OF SYSTEMS  Review of Systems   Constitutional: Negative for chills, fever and weight loss. Respiratory: Negative for shortness of breath. Cardiovascular: Negative for chest pain. Gastrointestinal: Negative for constipation. Negative for fecal incontinence   Genitourinary: Negative for dysuria. Negative for urinary incontinence   Musculoskeletal:        Per HPI   Skin: Negative for rash. Neurological: Negative for dizziness, tingling, tremors, focal weakness and headaches. Endo/Heme/Allergies: Does not bruise/bleed easily. Psychiatric/Behavioral: The patient does not have insomnia. PHYSICAL EXAMINATION  Visit Vitals  /82 (BP 1 Location: Left arm, BP Patient Position: Sitting)   Pulse 71   Temp 98.4 °F (36.9 °C) (Temporal)   Resp 19   Ht 5' 6\" (1.676 m)   Wt 165 lb 6.4 oz (75 kg)   SpO2 99%   BMI 26.70 kg/m²         Accompanied by self. Constitutional:  Well developed, well nourished, in no acute distress. Psychiatric: Affect and mood are appropriate.    Integumentary: No rashes or abrasions noted on exposed areas. Cardiovascular/Peripheral Vascular: No peripheral edema is noted BLE. SPINE/MUSCULOSKELETAL EXAM      Lumbar spine:  No rash, ecchymosis, or gross obliquity. No fasciculations. No focal atrophy is noted. Tenderness to palpation none lumbar spine. No tenderness to palpation at the sciatic notch. SI joints non-tender. Trochanters non tender. MOTOR:       Hip Flex  Quads Hamstrings Ankle DF EHL Ankle PF   Right +4/5 +4/5 +4/5 +4/5 +4/5 +4/5   Left +4/5 +4/5 +4/5 +4/5 +4/5 +4/5     Straight Leg raise negative. Ambulation without assistive device. FWB. Written by Thien Mireles, as dictated by Shital Lindsay MD.    I, Dr. Shital Lindsay MD, confirm that all documentation is accurate. Mr. Debi Muhammad may have a reminder for a \"due or due soon\" health maintenance. I have asked that he contact his primary care provider for follow-up on this health maintenance.

## 2019-10-17 NOTE — PROGRESS NOTES
[address]  presents today for   Chief Complaint   Patient presents with    Back Pain     fu       Is someone accompanying this pt? NO    Is the patient using any DME equipment during OV? NO    Depression Screening:  3 most recent PHQ Screens 10/17/2019   Little interest or pleasure in doing things Not at all   Feeling down, depressed, irritable, or hopeless Not at all   Total Score PHQ 2 0         Coordination of Care:  1. Have you been to the ER, urgent care clinic since your last visit? NO  Hospitalized since your last visit? NO    2. Have you seen or consulted any other health care providers outside of the [address] [place] [address] since your last visit? NO Include any pap smears or colon screening.  NO    Last  Checked 10/17/19

## 2019-10-17 NOTE — PATIENT INSTRUCTIONS
Aprenda sobre cómo tener alonso espalda saludable - [ Learning About How to Have a Healthy Back ]  ¿Qué causa el dolor de espalda? El dolor de espalda a menudo es causado por uso excesivo, distensión o lesión. Por ejemplo, las personas frecuentemente se lastiman la espalda al practicar deportes o trabajar en el mehreen, al ser sacudidas en un accidente automovilístico o al levantar algo demasiado pesado. El envejecimiento también desempeña un papel. Lei Mikey y los músculos tienden a perder fuerza a medida que envejece, lo cual aumenta las probabilidades de [place] Loralie Haitian. Los discos [employer] huesos de la columna vertebral (vértebras) podrían tener desgaste y ya no proporcionar suficiente amortiguamiento entre los [place]. Un disco que sobresale o que se rompe (hernia de disco) puede presionar sobre los nervios, causando dolor de [place]. En [employer], el dolor de espalda es el resultado de la artritis, de vértebras rotas debido a la pérdida de [employer] (osteoporosis), de alonso enfermedad o de un problema de la columna vertebral.  Aunque la mayoría de las personas tienen dolor de espalda en un momento u otro, hay medidas que se pueden jenifer para reducir la probabilidad de sufrirlo. ¿Cómo puede tener alonso espalda saludable? Reduzca la tensión en la espalda mediante alonso buena postura  El desplomarse o encorvarse por sí solo jose francisco vez no cause dolor en la parte baja de la espalda. Morgan alonso vez que la espalda se ha distendido o lesionado, la malorie postura puede empeorar el dolor. · Duerma en alonso posición que mantenga las curvas naturales de la espalda y en un colchón cómodo. Duerma de lado con alonso almohada entre las rodillas o boca arriba con alonso almohada debajo de las rodillas. Estas posiciones pueden reducir la tensión en la espalda. · Manténgase erguido cuando esté de pie o sentado.  Tener alonso \"buena postura\" por lo general significa que las Michael, los hombros y las caderas están en Rosie Ingrid recta.  · Si debe permanecer de pie mucho tiempo, ponga un pie sobre un taburete, un bordillo o Fredo Malik. Cambie de pie cada cierto tiempo. · Siéntese en alonso silla que sea lo suficientemente baja soledad para poner ambos pies en el suelo con las rodillas más o menos al nivel de la cadera. Si koo silla o koo escritorio son Lea Dryer, utilice un reposapiés con el fin de elevar las rodillas. Colóquese alonso almohada pequeña, alonso toalla enrollada o un rollo lumbar en la curva de la espalda si necesita más apoyo. · Pruebe usar alonso silla ergonómica con apoyo para las rodillas (\"kneeling chair\"), pues ayuda a inclinar las caderas hacia tate. [place] le reduce la presión en la parte baja de la espalda. · Intente sentarse sobre alonso pelota de ejercicio. Puede balancearse de lado a lado, lo que ayuda a mantener la espalda relajada. · Al conducir, [place] las rodillas renee al nivel de las caderas. Siéntese derecho y conduzca con ambas anali sobre el volante. Los brazos deben estar levemente flexionados. Reduzca la tensión en la espalda levantando objetos con cuidado  · Agáchese doblando solo la cadera y las rodillas. Si es necesario, ponga alonso rodilla en el suelo y extienda la otra rodilla frente a usted en ángulo recto (genuflexión). · Empuje el pecho hacia adelante. [place] le ayuda a mantener la parte superior de la espalda derecha mientras mantiene un arco ligero en la parte baja. · Sostenga la carga tan cerca del cuerpo soledad sea posible, a la altura del ombligo. · Utilice los pies para cambiar de dirección con pasos cortos. · Dirija con las caderas al cambiar de dirección. Mantenga los hombros en línea con las caderas [place] se desplaza. · Asiente la carga con cuidado, acuclillándose únicamente con las rodillas y las caderas. Ejercite y estire la espalda  · [place] algo de ejercicio la mayoría de los días de la [place], si koo médico lo aprueba. Puede caminar, correr, nadar o montar en bicicleta.   · [employer] músculos de la espalda. Los siguientes son algunos ejercicios que puede probar:  ? Acuéstese de espalda y lleve suavemente alonso rodilla flexionada hacia Anthony Hassan. Vuelva a poner teto pie en el suelo y luego lizzy lo mismo con la otra rodilla. ? Lizzy inclinaciones de pelvis. Acuéstese de espalda con las rodillas flexionadas. Contraiga los músculos abdominales. Hunda el ombligo hacia adentro y [place], en dirección a las costillas. Deberá sentir soledad si la espalda estuviera ejerciendo presión sobre el suelo y que las caderas y la pelvis se despegan levemente del suelo. Mantenga la posición romel 6 segundos mientras respira de Liseth pausada. ? Siéntese con la espalda contra la pared. · Mantenga stefan los músculos del tronco. Los músculos de la espalda, el abdomen y los glúteos sostienen la columna vertebral.  ? Hunda el abdomen e imagine que está llevando el ombligo hacia la columna. Mantenga la posición romel 6 segundos y luego relájese. Recuerde seguir respirando de manera normal [employer]. ? Latosha Rattan. Hágalos siempre con las rodillas dobladas. Mantenga la parte baja de la espalda sobre el suelo y [employer] hombros hacia las rodillas con un movimiento lento y uniforme. Mantenga los brazos cruzados sobre el pecho. Si esto le causa molestias en el reynold, pruebe a poner las anali detrás del reynold (no de la maureen), con los codos abiertos. ? Acuéstese boca arriba con las rodillas flexionadas y los pies apoyados sobre el suelo. Contraiga los músculos abdominales y luego empuje con los pies y eleve las nalgas algunas pulgadas. Mantenga la posición romel 6 segundos mientras continúa respirando de Liseth normal y luego vuelva a bajar lentamente hacia el suelo. Repita de 8 a 12 veces. ? Si le gusta el ejercicio en nevaeh, pruebe con Pilates o yoga.  Estas clases tienen posiciones que fortalecen los músculos del tronco.  Lleve un estilo de mina saludable  · Mantenga un peso saludable para evitar sobrecargar la espalda. · No fume. Fumar aumenta el riesgo de osteoporosis, que debilita la columna vertebral. Si necesita ayuda para dejar de fumar, hable con koo médico sobre programas y medicamentos para dejar de fumar. Estos pueden aumentar lauren probabilidades de dejar el hábito para siempre. ¿Dónde puede encontrar más información en inglés? Valentino Fraise a http://lonny-leonor.info/. Escriba L315 en la búsqueda para aprender más acerca de \"Aprenda sobre cómo tener alonso espalda saludable - [ Learning About How to Have a Healthy Back ]. \"  Revisado: 26 junio, 2019  Versión del contenido: 12.2  © 1871-3146 Healthwise, Incorporated. Las instrucciones de cuidado fueron adaptadas bajo licencia por [employer] (which disclaims liability or warranty for this information). Si usted tiene [place] afección médica o sobre estas instrucciones, siempre pregunte a koo profesional de mehran. Healthwise, Incorporated niega toda garantía o responsabilidad por koo uso de esta información. Dolor de espalda, síntomas urgentes o de emergencia: Instrucciones de cuidado - [ Back Pain, Emergency or Urgent Symptoms: Care Instructions ]  Instrucciones de cuidado    Muchas personas tienen dolor de espalda en algún momento. En la [employer], el dolor mejora con los cuidados personales, lo que incluye analgésicos (medicamentos para el dolor) de [place], hielo, calor y ejercicios. A menos que tenga síntomas de alonso lesión grave o de ataque al corazón, es posible que pueda dejar pasar algunos días antes de llamar al médico. Morgan algunos problemas de espalda son muy graves. No ignore los síntomas que deberían ser revisados de inmediato. La atención de seguimiento es alonso parte clave de koo tratamiento y seguridad. Asegúrese de hacer y acudir a todas las citas, y llame a koo médico si está teniendo problemas.  También es alonso buena idea saber los [place] de lauren exámenes y [employer] lista de los [place] cornelius. ¿Cómo puede cuidarse en el hogar? · Siéntese o acuéstese en las posiciones más cómodas y que reduzcan el dolor. Pruebe alonso de estas posiciones cuando se acueste:  ? Acuéstese boca arriba con las rodillas dobladas y apoyadas sobre [address]. ? Acuéstese en el piso con las piernas sobre el asiento de un sofá o alonso silla. ? Acuéstese de lado con las rodillas [address] las caderas y [place] las piernas. ? Acuéstese boca abajo siempre que esta posición no empeore el dolor. · No se siente sobre la cama y evite los sillones blandos, así soledad las posiciones torcidas. El reposo en cama puede aliviar el dolor al principio, richard retrasa la sanación. Evite reposar en la cama después del primer día. · Cambie de posición cada 30 minutos. Si debe sentarse por [employer], párese y descanse de estar sentado. Levántese y camine, o acuéstese en posición horizontal.  · Pruebe a usar alonso almohadilla térmica a temperatura baja o mediana de 15 a 20 minutos cada 2 o 3 horas. Pruebe con [place] marieha tibia en vez de alonso sesión con la almohadilla térmica. También puede comprar envolturas calientes (\"heat wraps\") desechables que carranza hasta 8 horas. También puede tratar de colocarse hielo o compresas frías en la espalda de 10 a 20 minutos cada vez, varias veces al día. (Póngase un paño sam entre el hielo y la piel). [place] reduce el dolor y le será más fácil mantenerse activo y hacer ejercicio. · [employer] analgésicos exactamente según las indicaciones. ? Si el médico le recetó analgésicos, tómelos según las indicaciones. ? Si no está tomando un analgésico recetado, pregúntele a koo médico si puede jenifer ton de [employer]. ¿Cuándo debe pedir ayuda? Llame al 911 en cualquier momento que considere que necesita atención de [place].  Por ejemplo, llame si:    · Es absolutamente incapaz de  alonso pierna.     · Tiene dolor de espalda junto con dolor abdominal intenso.     · Tiene síntomas de un ataque al corazón. Estos pueden incluir:  ? [place] en el pecho, o alonso sensación extraña en el pecho.  ? Sudoración. ? Falta de aire. ? Náuseas o vómito. ? Dolor, presión o alonso sensación extraña en la espalda, el reynold, la mandíbula o la parte superior del abdomen, o en ton o ambos hombros o brazos. ? Aturdimiento o debilidad repentina. ? Latidos cardíacos rápidos o irregulares. Después de llamar al  911, el operador puede decirle que [place] 1 aspirina para adultos o de 2 a 4 aspirinas de dosis baja. Espere alonso ambulancia. No trate de conducir usted mismo.    Llame a koo médico ahora mismo o busque atención médica inmediata si:    · Tiene síntomas nuevos o peores en los brazos, las piernas, el pecho, el abdomen o las nalgas. Los síntomas pueden incluir:  ? Entumecimiento u hormigueo. ? Debilidad. ? Dolor.     · Pierde el control de la vejiga o los intestinos.     · Tiene dolor de espalda y:  ? Se ha lesionado la espalda al levantar o al hacer alguna [place]. Llame si el dolor es intenso, no ha desaparecido después de 1 o 2 días y no puede hacer lauren actividades normales diarias. ? Anteriormente ha tenido alonso lesión en la espalda que requirió [place]. ? El dolor ha durado más de 4 [address] ? [place] perdido peso de Liseth inexplicable. ? Tiene fiebre. ? Tiene [address] ? Tiene cáncer o lo whitley tenido con anterioridad.    Preste especial atención a los cambios en koo mehran y asegúrese de comunicarse con koo médico si no mejora soledad se esperaba. ¿Dónde puede encontrar más información en inglés? Estelita Spurling a http://lonny-leonor.info/. Eder [place] D435 en la búsqueda para aprender más acerca de \"Dolor de espalda, síntomas urgentes o de emergencia: Instrucciones de cuidado - [ Back Pain, Emergency or Urgent Symptoms: Care Instructions ]. \"  Revisado: 26 junio, 2019  Versión del contenido: 12.2  © 9135-3049 [employer]., Incorporated.  Las instrucciones de cuidado fueron adaptadas bajo licencia por Good Help Connections (which disclaims liability or warranty for this information). Si usted tiene [place] afección médica o sobre estas instrucciones, siempre pregunte a koo profesional de mehran. [facility]orporated niega toda garantía o responsabilidad por koo uso de esta información.